# Patient Record
Sex: FEMALE | Race: WHITE
[De-identification: names, ages, dates, MRNs, and addresses within clinical notes are randomized per-mention and may not be internally consistent; named-entity substitution may affect disease eponyms.]

---

## 2017-02-15 ENCOUNTER — HOSPITAL ENCOUNTER (OUTPATIENT)
Dept: HOSPITAL 47 - PNWHC3 | Age: 32
Discharge: HOME | End: 2017-02-15
Payer: COMMERCIAL

## 2017-02-15 VITALS
DIASTOLIC BLOOD PRESSURE: 72 MMHG | RESPIRATION RATE: 16 BRPM | HEART RATE: 107 BPM | TEMPERATURE: 97.8 F | SYSTOLIC BLOOD PRESSURE: 114 MMHG

## 2017-02-15 DIAGNOSIS — G89.4: ICD-10-CM

## 2017-02-15 DIAGNOSIS — Z72.0: ICD-10-CM

## 2017-02-15 DIAGNOSIS — F12.90: ICD-10-CM
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
## 2017-02-15 DIAGNOSIS — M79.7: Primary | ICD-10-CM

## 2017-02-15 DIAGNOSIS — Z79.899: ICD-10-CM

## 2017-02-15 DIAGNOSIS — S39.92XD: ICD-10-CM

## 2017-02-15 PROCEDURE — 99211 OFF/OP EST MAY X REQ PHY/QHP: CPT
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
## 2018-12-28 ENCOUNTER — HOSPITAL ENCOUNTER (EMERGENCY)
Dept: HOSPITAL 47 - EC | Age: 33
Discharge: HOME | End: 2018-12-28
Payer: COMMERCIAL

## 2018-12-28 VITALS
HEART RATE: 91 BPM | RESPIRATION RATE: 20 BRPM | TEMPERATURE: 97.4 F | SYSTOLIC BLOOD PRESSURE: 130 MMHG | DIASTOLIC BLOOD PRESSURE: 88 MMHG

## 2018-12-28 DIAGNOSIS — S93.401A: Primary | ICD-10-CM

## 2018-12-28 DIAGNOSIS — F17.200: ICD-10-CM
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
## 2018-12-28 DIAGNOSIS — Z88.5: ICD-10-CM

## 2018-12-28 DIAGNOSIS — F41.9: ICD-10-CM

## 2018-12-28 DIAGNOSIS — G40.909: ICD-10-CM

## 2018-12-28 DIAGNOSIS — Z79.899: ICD-10-CM

## 2018-12-28 DIAGNOSIS — F32.9: ICD-10-CM
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
## 2018-12-28 PROCEDURE — 99284 EMERGENCY DEPT VISIT MOD MDM: CPT
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
## 2018-12-28 NOTE — XR
EXAMINATION TYPE: XR tibia fibula RT

 

DATE OF EXAM: 12/28/2018

 

COMPARISON: NONE

 

HISTORY: Pain

 

TECHNIQUE: 3 views

 

FINDINGS: Tibia and fibula appear intact. I see no fracture nor dislocation. There are no pathologic 
calcifications. Knee joint is intact.

 

IMPRESSION: Negative right tibia and fibula exam.

## 2018-12-28 NOTE — XR
EXAMINATION TYPE: XR ankle complete RT

 

DATE OF EXAM: 12/28/2018

 

COMPARISON: NONE

 

HISTORY: Pain

 

TECHNIQUE: 3 views

 

FINDINGS: Ankle mortise is anatomic. I see no fracture nor dislocation. Joint spaces are normal. Ther
e are no pathologic calcifications.

 

IMPRESSION: Negative right ankle exam.

## 2020-03-05 ENCOUNTER — HOSPITAL ENCOUNTER (OUTPATIENT)
Dept: HOSPITAL 47 - RADUSMAIN | Age: 35
Discharge: HOME | End: 2020-03-05
Attending: FAMILY MEDICINE
Payer: COMMERCIAL

## 2020-03-05 DIAGNOSIS — K83.8: ICD-10-CM

## 2020-03-05 DIAGNOSIS — K80.20: Primary | ICD-10-CM

## 2020-03-05 PROCEDURE — 76705 ECHO EXAM OF ABDOMEN: CPT

## 2020-03-05 NOTE — US
EXAMINATION TYPE: US abdomen limited

 

DATE OF EXAM: 3/5/2020

 

COMPARISON: NONE

 

CLINICAL HISTORY: R74.8 abnormal levels os serum enzymes. Abnormal labs, ABD pain

 

EXAM MEASUREMENTS:

 

Liver Length:  14.8 cm   

Gallbladder Wall:  0.2 cm   

CBD:  0.7 cm

Right Kidney:  10.4 x 5.3 x 5.4 cm

 

 

 

Pancreas:  Obscured by bowel gas

Liver:  Heterogeneous  

Gallbladder:  Multiple mobile gallstones filing lumen

**Evidence for sonographic Arango's sign:  Yes

CBD:  Dilated 

Right Kidney:  wnl, lower pole gassed out 

 

 

 

IMPRESSION: 

1. Cholelithiasis. CBD is borderline dilated distal CBD stone would be in the differential diagnosis 
correlate clinically.

2. Nonspecific pattern of liver can be seen with hepatic steatosis, hepatitis or diffuse hepatocellul
ar disease.

## 2022-02-26 NOTE — US
EXAMINATION TYPE: US gallbladder

 

DATE OF EXAM: 2/26/2022

 

COMPARISON: NONE

 

CLINICAL HISTORY: pain.

 

EXAM MEASUREMENTS:

 

Liver Length:  13.9 cm   

Gallbladder Wall:  0.7 cm   

CBD:  0.4 cm

Right Kidney:  10.1 x 5.9 x 4.2 cm

 

Morbidly obese patient unable to cooperate with examiner due to severe pain. Very limited study.

 

Pancreas:  Obscured by bowel gas

Liver:  minimal visualization shows no obvious abnormality 

Gallbladder:  JOEL sign, probable thickened wall

**Evidence for sonographic Arango's sign:  no

CBD:  wnl, very limited visualization

Right Kidney:  wnl, as seen, limited views

 

 

 

IMPRESSION:

 

Gallbladder is contracted and full of gallstones. No dilated ducts. No evidence of a discrete liver m
ass.
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
## 2022-02-27 NOTE — P.PN
Subjective


Progress Note Date: 02/27/22


Principal diagnosis: 


Acute cholecystitis, perforated antral ulcer, diffuse peritonitis





Patient seen and examined at bedside, has been having issues with breakthrough 

pain in between 3 hour Dilaudid dosing and concurrent nausea with medications.  

Has been up to chair today, admits to flatus, no bowel movement yet.  On the 

whole feeling better than when she initially came in but she is having worsened 

midline incisional pain today and subdiaphragmatic pain with deep breathing.  VALERIA

drains of been yielding serosanguineous fluid.  Has been mildly tachycardic to 

the high 90s in the low 100s, normotensive to hypertensive overnight.  R[place] in 

place with adequate urine output.  Nasogastric tube in place with mild dark 

greenish gastric output no signs of upper GI bleed.  Laboratory studies show 

persistent leukocytosis of 19,000, hemoglobin relatively stable at 12.5 platelet

count 311.  Serum electrolyte analysis shows a slightly low CO2 at 20, liver 

function studies essentially within normal limits with AST and ELT at 32 and 53 

respectively, PHOSPHATASE 98, TOTAL BILIRUBIN NORMAL RANGE AT 0.5.  LIPASE 21.








Objective





- Vital Signs


Vital signs: 


                                   Vital Signs











Temp  98.5 F   02/27/22 08:00


 


Pulse  96   02/27/22 11:00


 


Resp  18   02/27/22 11:00


 


BP  133/82   02/27/22 11:00


 


Pulse Ox  94 L  02/27/22 11:05








                                 Intake & Output











 02/26/22 02/27/22 02/27/22





 18:59 06:59 18:59


 


Intake Total 2700 1400 400


 


Output Total 425 1175 400


 


Balance 2275 225 0


 


Weight  126 kg 


 


Intake:   


 


  IV 2700 1400 400


 


    Ampicillin-Sulbactam 3 gm  200 





    In Sodium Chloride 0.9%   





    100 ml @ 200 mls/hr IVPB   





    Q6HR BETTE Rx#:[id number]   


 


    Fluconazole in NaCl,Iso- 100  





    Osm 200 mg In Saline 1   





    100ml.bag @ 100 mls/hr   





    IVPB DAILY@1600 BETTE Rx#:   





    [id number]   


 


    Sodium Chloride 0.9% 1, 300 1200 400





    000 ml @ 100 mls/hr IV .   





    Q10H BETTE Rx#:[id number]   


 


Output:   


 


  Gastric Drainage  50 


 


  Drainage  90 


 


    Left Abdomen  30 


 


    Right Abdomen  60 


 


  Urine 325 1035 400


 


  Estimated Blood Loss 100  


 


Other:   


 


  Voiding Method  Indwelling Catheter Indwelling Catheter


 


  # Voids 0  














- Constitutional


General appearance: Present: cooperative, obese





- EENT


Eyes: Present: PERRLA





- Respiratory


Respiratory: bilateral: CTA





- Cardiovascular


Rhythm: regular





- Gastrointestinal


Gastrointestinal Comment(s): 


Midline wound VAC in place to suction without leak, VALERIA drains in place with 

serosanguineous output.  Right upper quadrant pain is essentially resolved, she 

has moderate and appropriate midline incisional tenderness, diffuse tenderness 

is absent.  No guarding rebound or distention.  Abdominal exam on the whole 

improved compared to preoperatively yesterday.








- Genitourinary


Genitourinary Comment(s): 


R[place] in place with clear urine output








- Neurologic


Neurologic: Present: CNII-XII intact





- Psychiatric


Psychiatric: Present: A&O x's 3





- Labs


CBC & Chem 7: 


                                 02/27/22 05:54





                                 02/27/22 05:54


Labs: 


                  Abnormal Lab Results - Last 24 Hours (Table)











  02/26/22 02/26/22 02/27/22 Range/Units





  16:00 23:46 05:54 


 


WBC    19.0 H  (3.8-10.6)  k/uL


 


Neutrophils #    16.5 H  (1.3-7.7)  k/uL


 


Chloride     ()  mmol/L


 


Carbon Dioxide     (22-30)  mmol/L


 


POC Glucose (mg/dL)  161 H  129 H   (75-99)  mg/dL


 


Calcium     (8.4-10.2)  mg/dL


 


ALT     (4-34)  U/L


 


Total Protein     (6.3-8.2)  g/dL


 


Albumin     (3.5-5.0)  g/dL


 


Lipase     ()  U/L














  02/27/22 Range/Units





  05:54 


 


WBC   (3.8-10.6)  k/uL


 


Neutrophils #   (1.3-7.7)  k/uL


 


Chloride  112 H  ()  mmol/L


 


Carbon Dioxide  20 L  (22-30)  mmol/L


 


POC Glucose (mg/dL)   (75-99)  mg/dL


 


Calcium  7.6 L  (8.4-10.2)  mg/dL


 


ALT  53 H  (4-34)  U/L


 


Total Protein  5.5 L  (6.3-8.2)  g/dL


 


Albumin  2.6 L  (3.5-5.0)  g/dL


 


Lipase  21 L  ()  U/L








                      Microbiology - Last 24 Hours (Table)











 02/26/22 13:30 Gram Stain - Preliminary





 Peritoneal Fluid Body Fluid Culture - Preliminary


 


 02/26/22 13:30 Anaerobic Culture - Preliminary





 Peritoneal Fluid 


 


 02/26/22 13:30 Fungal Culture - Preliminary





 Peritoneal Fluid 














Assessment and Plan


Assessment: 


36-year-old lady postoperative day 1 from laparoscopic cholecystectomy for acute

cholecystitis and subsequent exploratory laparotomy and washout with modified 

Lexa patch repair of a 1 cm perforated antral ulcer with diffuse peritonitis. 

On broad-spectrum antibiotics with Unasyn and antifungal coverage with 

fluconazole.  Cultures pending.  Waxing and waning hypotension preoperatively, 

likely a degree of sepsis which seems to be subsiding with source control.  

Long-standing NSAID use with ibuprofen 800 mg every 8 hours for the past 10 or 

more years and intermittent symptoms of melena.  Differential however does not 

implicate an iron deficiency anemia.





Plan: 


Pain and nausea medications were adjusted with a 2 mg Dilaudid every 3 hour dose

and intervening 0.5-1 mg Dilaudid doses every 1 hour for breakthrough.  Baseline

nonnarcotic pain relief with 04 mouth every 8 hours 4 doses.  Oral medications 

will be unreliable for now given antral perforation, nasogastric decompression 

and anticipated associated ileus.  No Toradol given antral ulcer, suspected 

degree of gastritis and risk of bleeding.  We will ask for wound care 

consultation for bedside midline incisional wound VAC dressing changes every 

Monday Wednesday and Friday.  Soft tissue was left open due to gross 

contamination at time of surgery, fascia is closed.  Antibiotic adjustments 

pending culture results, present coverage and source control seems adequate for 

now.  If she remains hemodynamically stable overnight I anticipate she may be 

ready for transfer out of the ICU by tomorrow, [place] catheter for strict intake 

and output monitoring for now.  Increase activity as pain control improved.  ICU

consultation reviewed and appreciated.





Time with Patient: Greater than 30
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
## 2022-02-28 NOTE — XR
EXAMINATION TYPE: XR chest 1V confirm line plcmt, XR chest 1V confirm line plcmt

 

DATE OF EXAM: 2/28/2022

 

COMPARISON: NONE

 

HISTORY: Status post PICC line placement

 

TECHNIQUE: Single frontal view of the chest is obtained. Follow-up chest x-ray performed following PI
CC line position

 

FINDINGS: Initial chest x-ray shows the following.  Left-sided PICC line is present. Distal tip is wi
thin the heart. Exam is expiratory and rotated. There is a drain in the left upper quadrant. Surgical
 clips present right upper quadrant. No evident pneumothorax. Drain is present in the right upper aroldo
drant.

 

Following repositioning of the PICC line followed chest x-ray shows the distal tip of the catheter ne
ar the cavoatrial junction level.

 

IMPRESSION:  PICC line as described

## 2022-02-28 NOTE — IR
EXAMINATION TYPE: IR cvc insert >=5 years

 

DATE OF EXAM: 2/28/2022

 

COMPARISON: NONE

 

HISTORY: Infection, needs long-term intravenous access for antibiotics

 

FINDINGS: Maximal barrier technique was utilized.  Hand hygiene obtained with soap and water and alco
hol-based hand rub. The skin overlying the left basilic vein was localized with ultrasound and noted 
to be compressible and patent by ultrasound.  [facility]. Sterile technique utilized with the ultrasound machine. The skin overlying was prepped an
d draped and Lidocaine used for local anesthesia.  A skin nick was made with a scalpel.  Access was g
ained to the vein under direct ultrasound guidance with a 21-gauge needle and a 0.018 inch wire was a
dvanced.  Access site was dilated with a peel-away sheath and the catheter tailored to length.  Pauly
ter advanced centrally and a post procedure chest x-ray verified placement with tip at the superior v
sebastian cava.  Catheter was fixed to the skin and a sterile dressing placed.  Hemostasis achieved and the
 catheter was aspirated and flushed with sterile saline.  The patient remained in stable condition.

 

IMPRESSION: STATUS POST ULTRASOUND GUIDED PICC LINE PLACEMENT, READY FOR USE.  THIS PROCEDURE WAS PER
FORMED BY THE UNDERSIGNED.
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
## 2022-03-01 NOTE — P.PN
Subjective


Progress Note Date: 03/01/22





Patient seen and examined at bedside.  States she is feeling much better today. 

States she is having flatus.  States abdominal pain is well-controlled.  Denies 

nausea or vomiting.  VALERIA drains with serosanguineous output.





Objective





- Vital Signs


Vital signs: 


                                   Vital Signs











Temp  99.4 F   03/01/22 04:00


 


Pulse  93   03/01/22 06:00


 


Resp  18   03/01/22 06:00


 


BP  136/77   03/01/22 06:00


 


Pulse Ox  92 L  03/01/22 06:00








                                 Intake & Output











 02/28/22 03/01/22 03/01/22





 18:59 06:59 18:59


 


Intake Total 1500 1600 


 


Output Total 840 795 


 


Balance 660 805 


 


Intake:   


 


  IV 1500 1600 


 


    ACETAMINOPHEN IV (For  200 





    ) 1,000 mg In Empty Bag 1   





    bag @ 400 mls/hr IVPB   





    Q8H BETTE Rx#:[id number]   


 


    Ampicillin-Sulbactam 3 gm 200 200 





    In Sodium Chloride 0.9%   





    100 ml @ 200 mls/hr IVPB   





    Q6HR BETTE Rx#:[id number]   


 


    Fluconazole in NaCl,Iso- 100  





    Osm 200 mg In Saline 1   





    100ml.bag @ 100 mls/hr   





    IVPB DAILY@1600 BETTE Rx#:   





    [id number]   


 


    Sodium Chloride 0.9% 1, 1100 1200 





    000 ml @ 100 mls/hr IV .   





    Q10H BETTE Rx#:[id number]   


 


Output:   


 


  Drainage 120 80 


 


    Abdomen 20 20 


 


    Left Abdomen 60 40 


 


    Right Abdomen 40 20 


 


  Urine 720 715 


 


Other:   


 


  Voiding Method Indwelling Catheter Indwelling Catheter 














- Constitutional


General appearance: Present: cooperative, no acute distress





- Respiratory


Details: 





No difficulty with respiration





- Gastrointestinal


Gastrointestinal Comment(s): 





Soft, appropriate tenderness, wound VAC in place with appropriate seal and 

suction, VALERIA drains in place with serosanguineous output, nondistended





- Musculoskeletal


Musculoskeletal: Present: generalized weakness





- Psychiatric


Psychiatric: Present: A&O x's 3





- Labs


CBC & Chem 7: 


                                 03/01/22 06:10





                                 03/01/22 06:10


Labs: 


                  Abnormal Lab Results - Last 24 Hours (Table)











  02/28/22 03/01/22 03/01/22 Range/Units





  18:35 06:10 06:10 


 


WBC   11.9 H   (3.8-10.6)  k/uL


 


RBC   3.69 L   (3.80-5.40)  m/uL


 


Hgb   10.5 L   (11.4-16.0)  gm/dL


 


Hct   32.8 L   (34.0-46.0)  %


 


Neutrophils #   9.3 H   (1.3-7.7)  k/uL


 


Chloride    114 H  ()  mmol/L


 


Carbon Dioxide    20 L  (22-30)  mmol/L


 


POC Glucose (mg/dL)  72 L    (75-99)  mg/dL


 


Calcium    7.4 L  (8.4-10.2)  mg/dL


 


Total Protein    5.6 L  (6.3-8.2)  g/dL


 


Albumin    2.6 L  (3.5-5.0)  g/dL








                      Microbiology - Last 24 Hours (Table)











 02/26/22 13:30 Anaerobic Culture - Preliminary





 Peritoneal Fluid 


 


 02/26/22 13:30 Gram Stain - Preliminary





 Peritoneal Fluid Body Fluid Culture - Preliminary














Assessment and Plan


Plan: 





36-year-old female postoperative day #3, exploratory laparotomy, Lexa patch 

repair and cholecystectomy


-Continue nasogastric tube and n.p.o. status at this time.  Patient is beginning

to have some bowel function, however, we will require more time prior to oral 

intake.  We will consider possible upper GI prior to beginning oral intake in 

the next day or 2.


-PICC line was placed.  Continue ID recommendations on IV antibiotics.  We'll 

consider TPN if no oral feeding soon


-Continue wound VAC with wound care consultation


- Increase activity.


- Plan for discontinuing Rome catheter in the next 24 hours


-Patient is likely able to be transferred from ICU to stepdown
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
## 2022-03-01 NOTE — P.PN
Subjective


Progress Note Date: 02/28/22





Patient seen and examined at bedside.  She states abdominal pain is overall well

controlled.  She is drowsy.  Nasogastric tube is in place.  VALERIA drains with 

serosanguineous output.  She states that her mouth is dry and she is requesting 

oral intake.





Objective





- Vital Signs


Vital signs: 


                                   Vital Signs











Temp  97.9 F   02/28/22 12:00


 


Pulse  84   02/28/22 12:00


 


Resp  15   02/28/22 12:00


 


BP  127/75   02/28/22 12:00


 


Pulse Ox  96   02/28/22 12:00








                                 Intake & Output











 02/27/22 02/28/22 02/28/22





 18:59 06:59 18:59


 


Intake Total 1600 1200 600


 


Output Total 905 830 370


 


Balance 695 370 230


 


Weight  124.2 kg 


 


Intake:   


 


  IV 1600 1200 600


 


    ACETAMINOPHEN IV (For  300 100





    ) 1,000 mg In Empty Bag 1   





    bag @ 400 mls/hr IVPB   





    Q8H BETTE Rx#:[id number]   


 


    Ampicillin-Sulbactam 3 gm 200  





    In Sodium Chloride 0.9%   





    100 ml @ 200 mls/hr IVPB   





    Q6HR BETTE Rx#:[id number]   


 


    Fluconazole in NaCl,Iso- 100  





    Osm 200 mg In Saline 1   





    100ml.bag @ 100 mls/hr   





    IVPB DAILY@1600 BETTE Rx#:   





    [id number]   


 


    Sodium Chloride 0.9% 1, 600 900 500





    000 ml @ 100 mls/hr IV .   





    Q10H BETTE Rx#:[id number]   


 


Output:   


 


  Drainage 60 40 


 


    Left Abdomen 30 40 


 


    Right Abdomen 30  


 


  Urine 845 790 370


 


Other:   


 


  Voiding Method Indwelling Catheter Indwelling Catheter Indwelling Catheter














- Constitutional


General appearance: Present: cooperative, no acute distress





- Respiratory


Details: 





No difficulty with respiration





- Gastrointestinal


Gastrointestinal Comment(s): 





Soft, appropriate tenderness, nondistended, wound VAC in place over wound, VALERIA 

drains in place with serosanguineous output.





- Musculoskeletal


Musculoskeletal: Present: generalized weakness





- Labs


CBC & Chem 7: 


                                 02/28/22 06:31





                                 02/28/22 06:31


Labs: 


                  Abnormal Lab Results - Last 24 Hours (Table)











  02/28/22 02/28/22 Range/Units





  06:31 06:31 


 


WBC  11.6 H   (3.8-10.6)  k/uL


 


RBC  3.72 L   (3.80-5.40)  m/uL


 


Hgb  10.7 L   (11.4-16.0)  gm/dL


 


Hct  33.5 L   (34.0-46.0)  %


 


Neutrophils #  9.2 H   (1.3-7.7)  k/uL


 


Chloride   113 H  ()  mmol/L


 


Calcium   7.3 L  (8.4-10.2)  mg/dL








                      Microbiology - Last 24 Hours (Table)











 02/26/22 13:30 Gram Stain - Preliminary





 Peritoneal Fluid Body Fluid Culture - Preliminary














Assessment and Plan


Plan: 





36-year-old female postoperative day #2, exploratory laparotomy, Lexa patch 

repair and cholecystectomy


-Continue nasogastric tube and n.p.o. status at this time.  Patient is beginning

to have some bowel function, however, we will require more time prior to oral 

intake.  We will consider possible upper GI prior to beginning oral intake.


-Plan for PICC line placement.  Patient is to continue with Unasyn and 

antifungal for antibiotics.  ID consultation will be placed


-Continue wound VAC with wound care consultation


-Patient is likely able to be transferred from ICU to stepdown
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
## 2022-03-01 NOTE — P.PN
Subjective


Progress Note Date: 03/01/22


Principal diagnosis: 


Perforated peptic ulcer disease and cholecystitis





Patient is a 36-year-old  female presenting to the ER or abdominal pain

in this patient did have evidence of cholecystitis perforated peptic ulcer 

disease status post cholecystectomy and modified Lexa patch repair of the 

perforated antral ulcer.


On today's evaluation that is 03/01/2022, the patient is afebrile patient is 

feeling slightly better the patient abdominal pain is currently controlled 

patient still have the NG however denies having any nausea or vomiting no 

diarrhea no chest pain shortness of breath or cough








Objective





- Vital Signs


Vital signs: 


                                   Vital Signs











Temp  99.4 F   03/01/22 04:00


 


Pulse  82   03/01/22 09:00


 


Resp  14   03/01/22 09:00


 


BP  133/80   03/01/22 09:00


 


Pulse Ox  96   03/01/22 09:00








                                 Intake & Output











 02/28/22 03/01/22 03/01/22





 18:59 06:59 18:59


 


Intake Total 1500 1600 400


 


Output Total 840 795 200


 


Balance 660 805 200


 


Intake:   


 


  IV 1500 1600 400


 


    ACETAMINOPHEN IV (For  200 





    ) 1,000 mg In Empty Bag 1   





    bag @ 400 mls/hr IVPB   





    Q8H [facility] Rx#:[id number]   


 


    Ampicillin-Sulbactam 3 gm 200 200 





    In Sodium Chloride 0.9%   





    100 ml @ 200 mls/hr IVPB   





    Q6HR [facility] Rx#:[id number]   


 


    Fluconazole in NaCl,Iso- 100  





    Osm 200 mg In Saline 1   





    100ml.bag @ 100 mls/hr   





    IVPB DAILY@1600 [facility] Rx#:   





    [id number]   


 


    Sodium Chloride 0.9% 1, 1100 1200 400





    000 ml @ 100 mls/hr IV .   





    Q10H [facility] Rx#:[id number]   


 


Output:   


 


  Drainage 120 80 


 


    Abdomen 20 20 


 


    Left Abdomen 60 40 


 


    Right Abdomen 40 20 


 


  Urine 720 715 200


 


Other:   


 


  Voiding Method Indwelling Catheter Indwelling Catheter Indwelling Catheter














- Exam


GENERAL DESCRIPTION: A middle-age female lying in bed in no distress





RESPIRATORY SYSTEM: Unlabored breathing , decreased breath sounds at bases





HEART: S1 S2 regular rate and rhythm ,





ABDOMEN: Soft , mild abdominal tenderness





EXTREMITIES: No edema feet








- Labs


CBC & Chem 7: 


                                 03/01/22 06:10





                                 03/01/22 06:10


Labs: 


                  Abnormal Lab Results - Last 24 Hours (Table)











  02/28/22 03/01/22 03/01/22 Range/Units





  18:35 06:10 06:10 


 


WBC   11.9 H   (3.8-10.6)  k/uL


 


RBC   3.69 L   (3.80-5.40)  m/uL


 


Hgb   10.5 L   (11.4-16.0)  gm/dL


 


Hct   32.8 L   (34.0-46.0)  %


 


Neutrophils #   9.3 H   (1.3-7.7)  k/uL


 


Chloride    114 H  ()  mmol/L


 


Carbon Dioxide    20 L  (22-30)  mmol/L


 


POC Glucose (mg/dL)  72 L    (75-99)  mg/dL


 


Calcium    7.4 L  (8.4-10.2)  mg/dL


 


Total Protein    5.6 L  (6.3-8.2)  g/dL


 


Albumin    2.6 L  (3.5-5.0)  g/dL








                      Microbiology - Last 24 Hours (Table)











 02/26/22 13:30 Anaerobic Culture - Preliminary





 Peritoneal Fluid 


 


 02/26/22 13:30 Gram Stain - Preliminary





 Peritoneal Fluid Body Fluid Culture - Preliminary














Assessment and Plan


(1) Peritonitis


Current Visit: Yes   Status: Acute   Code(s): K65.9 - PERITONITIS, UNSPECIFIED  

SNOMED Code(s): 37214666


   





(2) Perforated ulcer


Current Visit: Yes   Status: Acute   Code(s): K27.5 - CHRONIC OR UNSP PEPTIC 

ULCER, SITE UNSP, WITH PERFORATION   SNOMED Code(s): 64233125


   


Plan: 


1patient with admission to hospital abdominal pain mostly epigastric and right 

upper quadrant area with evidence of cholecystitis and  perforated peptic ulcer 

disease status post laparoscopic cholecystectomy and modified Lexa patch of 

the perforated ulcer will need to cover for the entire gram-negative and yeast 

to the likely pathogen causing peritonitis associated with perforated peptic 

ulcer disease


2abdominal cultures are currently pending and will be followed


3Unasyn 3 g every 6 hours and Diflucan will be continued








Time with Patient: Less than 30

## 2022-03-02 NOTE — FL
EXAMINATION TYPE: FL UGI

 

DATE OF EXAM: 3/2/2022

 

COMPARISON: None available

 

HISTORY: Status post cholecystectomy and repair of the gastric pyloric ulcer

 

TECHNIQUE:  A limited single contrast UGI study is performed.  A total of 2 minutes and 42 seconds of
 fluoroscopic time was utilized during procedure and 43 images obtained.

 

FINDINGS:

Cholecystectomy clips. 2 surgical drains are seen in the abdomen. NG tube is also seen in place.

 

Limited study to assess for leak. Free flow of the ingested oral contrast through the esophagus into 
the stomach without evidence of delay or obstruction. The contrast has passed into the first and seco
nd parts of duodenum with some transit delay into the third part of the duodenum.

 

No evidence of leak or obstruction at the operative site. Slight irregularity at the superior aspect 
of the pyloric antrum which could be related to the recent surgery. The patient remained asymptomatic
.

 

IMPRESSION:

No evidence of leak at the operative bed.

## 2022-03-02 NOTE — P.PN
Subjective


Progress Note Date: 03/02/22





Patient seen and examined at bedside. States he has been doing well. Denies 

nausea or vomiting. Pain controlled. Denies nausea or vomiting.





Objective





- Vital Signs


Vital signs: 


                                   Vital Signs











Temp  98 F   03/02/22 04:37


 


Pulse  80   03/02/22 04:37


 


Resp  18   03/02/22 04:37


 


BP  150/88   03/02/22 04:37


 


Pulse Ox  100   03/02/22 04:37








                                 Intake & Output











 03/01/22 03/02/22 03/02/22





 18:59 06:59 18:59


 


Intake Total 1600 500 


 


Output Total 790 1935 


 


Balance 810 -1435 


 


Intake:   


 


  IV 1500 500 


 


    ACETAMINOPHEN IV (For   





    ) 1,000 mg In Empty Bag 1   





    bag @ 400 mls/hr IVPB   





    Q8H [facility] Rx#:[id number]   


 


    Ampicillin-Sulbactam 3 gm 200  





    In Sodium Chloride 0.9%   





    100 ml @ 200 mls/hr IVPB   





    Q6HR [facility] Rx#:[id number]   


 


    Fluconazole in NaCl,Iso- 100  





    Osm 200 mg In Saline 1   





    100ml.bag @ 100 mls/hr   





    IVPB DAILY@1600 [facility] Rx#:   





    [id number]   


 


    Sodium Chloride 0.9% 1, 1100 500 





    000 ml @ 100 mls/hr IV .   





    Q10H [facility] Rx#:[id number]   


 


  Intake, IV Titration 100  





  Amount   


 


    Potassium Chloride 20 meq 100  





    In Water For Injection 1   





    100ml.bag @ 50 mls/hr   





    IVPB Q2H [facility] Rx#:   





    [id number]   


 


Output:   


 


  Drainage 120 135 


 


    Abdomen 30 40 


 


    Left Abdomen 40 20 


 


    Right Abdomen 50 75 


 


  Urine 670 1800 


 


Other:   


 


  Voiding Method Indwelling Catheter Indwelling Catheter 














- Constitutional


General appearance: Present: cooperative, no acute distress





- Respiratory


Details: 





no difficulty with respiration





- Gastrointestinal


Gastrointestinal Comment(s): 





soft, nontender, nondistended, wound vac in place, VALERIA drains in place with 

serosanguinous output





- Psychiatric


Psychiatric: Present: A&O x's 3





- Labs


CBC & Chem 7: 


                                 03/01/22 06:10





                                 03/01/22 06:10


Labs: 


                      Microbiology - Last 24 Hours (Table)











 02/26/22 13:30 Gram Stain - Preliminary





 Peritoneal Fluid Body Fluid Culture - Preliminary














Assessment and Plan


Plan: 





36-year-old female postoperative day #4, exploratory laparotomy, Lexa patch 

repair and cholecystectomy


-Continue nasogastric tube and n.p.o. status at this time.  UGI ordered for 

today, will evaluate prior to decision to remove NGT and begin liquids


-PICC in place. ID recs on abx


-Continue wound VAC, dressing change discussed with nursing for today


- Increase activity


-Continue med recs
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
## 2022-03-03 NOTE — P.PN
Subjective


Progress Note Date: 03/03/22


Principal diagnosis: 


Acute cholecystitis, perforated antral ulcer, diffuse peritonitis








Patient seen and examined at bedside, sleeping comfortably upright in a chair.  

No acute overnights per nursing.  As been advanced to clear liquids.  [place] 

catheter is in place with adequate clear urine output.  She remained 

hemodynamically stable and afebrile overnight.  Abdominal drains in place with 

predominantly serous output, wound VAC dressing is in place to suction without 

leak and serosanguineous output.  On Unasyn and fluconazole for broad-spectrum 

coverage post foregut perforation





Objective





- Vital Signs


Vital signs: 


                                   Vital Signs











Temp  97.9 F   03/03/22 12:43


 


Pulse  79   03/03/22 12:43


 


Resp  17   03/03/22 12:43


 


BP  123/81   03/03/22 12:43


 


Pulse Ox  94 L  03/03/22 12:43








                                 Intake & Output











 03/02/22 03/03/22 03/03/22





 18:59 06:59 18:59


 


Intake Total  200 


 


Output Total 1900 2870 80


 


Balance -1900 -2670 -80


 


Weight 124.2 kg  124.2 kg


 


Intake:   


 


  Oral  200 


 


Output:   


 


  Drainage 200 270 80


 


    Abdomen 60 20 


 


    Left Abdomen 60 50 80


 


    Right Abdomen 80 200 


 


  Urine 1700 2600 


 


    Uretheral ([place])  1500 


 


Other:   


 


  Voiding Method Indwelling Catheter Indwelling Catheter Indwelling Catheter














- Exam


Sleeping comfortably no acute distress








- Constitutional


General appearance: Present: obese





- Respiratory


Respiratory: bilateral: CTA





- Cardiovascular


Rhythm: regular





- Gastrointestinal


Gastrointestinal Comment(s): 


Abdomen is soft nontender to palpation, no guarding rebound or distention.


We'll VAC is in place to suction without leak.  VALERIA drains in place with 

predominantly serous output





General gastrointestinal: Present: soft





- Labs


CBC & Chem 7: 


                                 03/03/22 05:48





                                 03/03/22 05:48


Labs: 


                  Abnormal Lab Results - Last 24 Hours (Table)











  03/03/22 03/03/22 03/03/22 Range/Units





  05:48 05:48 05:50 


 


RBC  3.93 L    (4.10-5.20)  X 10*6/uL


 


Hgb  10.6 L    (12.0-15.0)  g/dL


 


Hct  33.7 L    (37.2-46.3)  %


 


MCHC  31.5 L    (32.0-37.0)  g/dL


 


MPV  9.2 L    (9.5-12.2)  fL


 


Immature Gran #  0.12 H    (0.00-0.04)  X 10*3/uL


 


Potassium   3.3 L   (3.5-5.5)  mmol/L


 


BUN   5.7 L   (9.0-27.0)  mg/dL


 


BUN/Creatinine Ratio   9.50 L   (12.00-20.00)  Ratio


 


POC Glucose (mg/dL)    109 H  (75-99)  mg/dL


 


Calcium   7.7 L   (8.7-10.3)  mg/dL


 


Total Bilirubin   <0.15 L   (0.30-1.20)  mg/dL


 


Total Protein   5.3 L   (6.2-8.2)  g/dL


 


Albumin   2.9 L   (3.8-4.9)  g/dL


 


Albumin/Globulin Ratio   1.21 L   (1.60-3.17)  g/dL








                      Microbiology - Last 24 Hours (Table)











 02/26/22 13:30 Anaerobic Culture - Final





 Peritoneal Fluid 


 


 02/26/22 13:30 Gram Stain - Final





 Peritoneal Fluid Body Fluid Culture - Final














Assessment and Plan


Assessment: 


36-year-old lady postoperative day 5 from laparoscopic cholecystectomy for acute

cholecystitis and subsequent exploratory laparotomy and washout with modified 

Lexa patch repair of a 1 cm perforated antral ulcer with diffuse peritonitis. 

On broad-spectrum antibiotics with Unasyn and antifungal coverage with 

fluconazole.  Waxing and waning hypotension preoperatively, likely a degree of 

sepsis which seems to be subsiding with source control.  Long-standing NSAID use

with ibuprofen 800 mg every 8 hours for the past 10 or more years and 

intermittent symptoms of melena. 





Plan: 


36-year-old female postoperative day #5, exploratory laparotomy, Lexa patch 

repair and cholecystectomy


-Clear liquids today, we'll likely start getting a VALERIA drains out tomorrow.  

Taper IV fluids and advance diet as oral intake improves.  Discontinue [place] 

catheter.


-PICC in place. ID recs on abx


-Continue wound VAC, dressing change discussed with nursing for today


- Increase activity


-Continue med recs





Time with Patient: Less than 30

## 2022-03-04 NOTE — P.PN
Subjective


Progress Note Date: 03/02/22


Principal diagnosis: 


Perforated peptic ulcer disease and cholecystitis





Patient is a 36-year-old  female presenting to the ER or abdominal pain

in this patient did have evidence of cholecystitis perforated peptic ulcer 

disease status post cholecystectomy and modified Lexa patch repair of the 

perforated antral ulcer.


On today's evaluation that is 03/02/2022, the patient remains to be afebrile , 

the patient abdominal pain is currently controlled patient denies having any 

nausea or vomiting no diarrhea no chest pain shortness of breath or cough








Objective





- Vital Signs


Vital signs: 


                                   Vital Signs











Temp  98 F   03/02/22 04:37


 


Pulse  80   03/02/22 04:37


 


Resp  18   03/02/22 04:37


 


BP  150/88   03/02/22 04:37


 


Pulse Ox  100   03/02/22 04:37








                                 Intake & Output











 03/01/22 03/02/22 03/02/22





 18:59 06:59 18:59


 


Intake Total 1600 500 


 


Output Total 790 1935 


 


Balance 810 -1435 


 


Intake:   


 


  IV 1500 500 


 


    ACETAMINOPHEN IV (For   





    ) 1,000 mg In Empty Bag 1   





    bag @ 400 mls/hr IVPB   





    Q8H [facility] Rx#:[id number]   


 


    Ampicillin-Sulbactam 3 gm 200  





    In Sodium Chloride 0.9%   





    100 ml @ 200 mls/hr IVPB   





    Q6HR [facility] Rx#:[id number]   


 


    Fluconazole in NaCl,Iso- 100  





    Osm 200 mg In Saline 1   





    100ml.bag @ 100 mls/hr   





    IVPB DAILY@1600 [facility] Rx#:   





    [id number]   


 


    Sodium Chloride 0.9% 1, 1100 500 





    000 ml @ 100 mls/hr IV .   





    Q10H [facility] Rx#:[id number]   


 


  Intake, IV Titration 100  





  Amount   


 


    Potassium Chloride 20 meq 100  





    In Water For Injection 1   





    100ml.bag @ 50 mls/hr   





    IVPB Q2H [facility] Rx#:   





    [id number]   


 


Output:   


 


  Drainage 120 135 


 


    Abdomen 30 40 


 


    Left Abdomen 40 20 


 


    Right Abdomen 50 75 


 


  Urine 670 1800 


 


Other:   


 


  Voiding Method Indwelling Catheter Indwelling Catheter 














- Exam


GENERAL DESCRIPTION: A middle-age female lying in bed in no distress





RESPIRATORY SYSTEM: Unlabored breathing , decreased breath sounds at bases





HEART: S1 S2 regular rate and rhythm ,





ABDOMEN: Soft , mild abdominal tenderness





EXTREMITIES: No edema feet








- Labs


CBC & Chem 7: 


                                 03/04/22 05:52





                                 03/04/22 05:52


Labs: 


                      Microbiology - Last 24 Hours (Table)











 02/26/22 13:30 Gram Stain - Preliminary





 Peritoneal Fluid Body Fluid Culture - Preliminary














Assessment and Plan


(1) Peritonitis


Current Visit: Yes   Status: Acute   Code(s): K65.9 - PERITONITIS, UNSPECIFIED  

SNOMED Code(s): 97635280


   





(2) Perforated ulcer


Current Visit: Yes   Status: Acute   Code(s): K27.5 - CHRONIC OR UNSP PEPTIC 

ULCER, SITE UNSP, WITH PERFORATION   SNOMED Code(s): 26136484


   


Plan: 


1patient with admission to hospital abdominal pain mostly epigastric and right 

upper quadrant area with evidence of cholecystitis and  perforated peptic ulcer 

disease status post laparoscopic cholecystectomy and modified Lexa patch of 

the perforated ulcer will need to cover for the entire gram-negative and yeast 

to the likely pathogen causing peritonitis associated with perforated peptic 

ulcer disease


2abdominal cultures are currently pending and will be followed so far no 

resistant bacteria growth, patient to continue with Unasyn 3 g every 6 hours and

Diflucan 





Time with Patient: Less than 30

## 2022-03-04 NOTE — P.PN
Subjective


Progress Note Date: 03/03/22


Principal diagnosis: 


Perforated peptic ulcer disease and cholecystitis





Patient is a 36-year-old  female presenting to the ER or abdominal pain

in this patient did have evidence of cholecystitis perforated peptic ulcer 

disease status post cholecystectomy and modified Lexa patch repair of the 

perforated antral ulcer.


On today's evaluation that is 03/03/2022, the patient is afebrile , the patient 

abdominal pain is currently controlled patient denies any nausea or vomiting no 

diarrhea , the patient denies chest pain shortness of breath or cough








Objective





- Vital Signs


Vital signs: 


                                   Vital Signs











Temp  97.9 F   03/03/22 12:43


 


Pulse  79   03/03/22 12:43


 


Resp  17   03/03/22 12:43


 


BP  123/81   03/03/22 12:43


 


Pulse Ox  94 L  03/03/22 12:43








                                 Intake & Output











 03/02/22 03/03/22 03/03/22





 18:59 06:59 18:59


 


Intake Total  200 


 


Output Total 1900 2870 80


 


Balance -1900 -2670 -80


 


Weight 124.2 kg  124.2 kg


 


Intake:   


 


  Oral  200 


 


Output:   


 


  Drainage 200 270 80


 


    Abdomen 60 20 


 


    Left Abdomen 60 50 80


 


    Right Abdomen 80 200 


 


  Urine 1700 2600 


 


    Uretheral ([place])  1500 


 


Other:   


 


  Voiding Method Indwelling Catheter Indwelling Catheter Indwelling Catheter














- Exam


GENERAL DESCRIPTION: A middle-age female lying in bed in no distress





RESPIRATORY SYSTEM: Unlabored breathing , decreased breath sounds at bases





HEART: S1 S2 regular rate and rhythm ,





ABDOMEN: Soft , mild abdominal tenderness





EXTREMITIES: No edema feet








- Labs


CBC & Chem 7: 


                                 03/04/22 05:52





                                 03/04/22 05:52


Labs: 


                  Abnormal Lab Results - Last 24 Hours (Table)











  03/03/22 03/03/22 03/03/22 Range/Units





  05:48 05:48 05:50 


 


RBC  3.93 L    (4.10-5.20)  X 10*6/uL


 


Hgb  10.6 L    (12.0-15.0)  g/dL


 


Hct  33.7 L    (37.2-46.3)  %


 


MCHC  31.5 L    (32.0-37.0)  g/dL


 


MPV  9.2 L    (9.5-12.2)  fL


 


Immature Gran #  0.12 H    (0.00-0.04)  X 10*3/uL


 


Potassium   3.3 L   (3.5-5.5)  mmol/L


 


BUN   5.7 L   (9.0-27.0)  mg/dL


 


BUN/Creatinine Ratio   9.50 L   (12.00-20.00)  Ratio


 


POC Glucose (mg/dL)    109 H  (75-99)  mg/dL


 


Calcium   7.7 L   (8.7-10.3)  mg/dL


 


Total Bilirubin   <0.15 L   (0.30-1.20)  mg/dL


 


Total Protein   5.3 L   (6.2-8.2)  g/dL


 


Albumin   2.9 L   (3.8-4.9)  g/dL


 


Albumin/Globulin Ratio   1.21 L   (1.60-3.17)  g/dL








                      Microbiology - Last 24 Hours (Table)











 02/26/22 13:30 Anaerobic Culture - Final





 Peritoneal Fluid 


 


 02/26/22 13:30 Gram Stain - Final





 Peritoneal Fluid Body Fluid Culture - Final














Assessment and Plan


(1) Peritonitis


Current Visit: Yes   Status: Acute   Code(s): K65.9 - PERITONITIS, UNSPECIFIED  

SNOMED Code(s): 45585019


   





(2) Perforated ulcer


Current Visit: Yes   Status: Acute   Code(s): K27.5 - CHRONIC OR UNSP PEPTIC 

ULCER, SITE UNSP, WITH PERFORATION   SNOMED Code(s): 56589008


   


Plan: 


1patient with admission to hospital abdominal pain mostly epigastric and right 

upper quadrant area with evidence of cholecystitis and  perforated peptic ulcer 

disease status post laparoscopic cholecystectomy and modified Lexa patch of 

the perforated ulcer will need to cover for the entire gram-negative and yeast 

to the likely pathogen causing peritonitis associated with perforated peptic 

ulcer disease


2abdominal fluid cultures has been finalized as no growth, patient to continue 

with Unasyn 3 g every 6 hours and Diflucan 





Time with Patient: Less than 30
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
## 2022-03-04 NOTE — P.PN
Subjective


Progress Note Date: 03/04/22


Principal diagnosis: 


Acute cholecystitis, perforated antral ulcer, diffuse peritonitis





Patient seen and examined at bedside.  Doing better today than yesterday, 

incisional and abdominal pain or improving, no reported fever or chills.  

Tolerating clear liquids, positive bowel movement yesterday.  Feeling hungry.  

No reports of chest pain, shortness of breath, or fever.  Laboratory studies 

today show a mild leukocytosis at around 10,000, hemoglobin stable.  Serum 

electrolyte analysis and liver function studies essentially normal.  Chest x-ray

from today shows a left-sided PICC in place, hypoinflation and blunting of 

costophrenic angles bilaterally.  Abdominal drains remain serous.  Wound VAC 

dressing as been changed at bedside by wound care team.








Objective





- Vital Signs


Vital signs: 


                                   Vital Signs











Temp  98.1 F   03/04/22 05:00


 


Pulse  87   03/04/22 05:00


 


Resp  18   03/04/22 05:00


 


BP  127/76   03/04/22 05:00


 


Pulse Ox  92 L  03/04/22 05:00








                                 Intake & Output











 03/03/22 03/04/22 03/04/22





 18:59 06:59 18:59


 


Intake Total 250 120 


 


Output Total 370 40 


 


Balance -120 80 


 


Weight 124.2 kg  124.2 kg


 


Intake:   


 


  Intake, IV Titration 250  





  Amount   


 


    D5-0.45% NaCl with KCl 150  





    20Meq/l 1,000 ml @ 75 mls   





    /hr IV .[id number] [facility] Rx#:   





    [id number]   


 


    Fluconazole in NaCl,Iso- 100  





    Osm 200 mg In Saline 1   





    100ml.bag @ 100 mls/hr   





    IVPB DAILY@1600 [facility] Rx#:   





    [id number]   


 


  Oral  120 


 


Output:   


 


  Drainage 370 40 


 


    Abdomen 130 20 


 


    Left Abdomen 120 20 


 


    Right Abdomen 120  


 


Other:   


 


  Voiding Method Toilet Toilet Toilet


 


  # Voids 1 1 1


 


  # Bowel Movements 1  














- Exam


Patient is alert and oriented 3, abdomen is soft nontender to palpation, no 

guarding rebound or distention.  Abdominal wound VAC dressing is in place to 

suction without leak.  VALERIA drains in place 3 with serous aspirate.








- Constitutional


General appearance: Present: no acute distress, obese





- EENT


Eyes: Present: PERRLA





- Respiratory


Respiratory: bilateral: CTA, diminished





- Cardiovascular


Rhythm: regular





- Gastrointestinal


Gastrointestinal Comment(s): 


Abdomen is soft nontender to palpation, no guarding rebound or distention.








- Neurologic


Neurologic: Present: CNII-XII intact





- Musculoskeletal


Musculoskeletal: Present: strength equal bilaterally





- Psychiatric


Psychiatric: Present: A&O x's 3, appropriate affect, intact judgment & insight





- Labs


CBC & Chem 7: 


                                 03/04/22 05:52





                                 03/04/22 05:52


Labs: 


                  Abnormal Lab Results - Last 24 Hours (Table)











  03/04/22 03/04/22 03/04/22 Range/Units





  05:52 05:52 12:03 


 


WBC  10.59 H    (4.50-10.00)  X 10*3/uL


 


RBC  3.88 L    (4.10-5.20)  X 10*6/uL


 


Hgb  10.7 L    (12.0-15.0)  g/dL


 


Hct  33.5 L    (37.2-46.3)  %


 


MCHC  31.9 L    (32.0-37.0)  g/dL


 


RDW  14.7 H    (11.5-14.5)  %


 


MPV  9.3 L    (9.5-12.2)  fL


 


Immature Gran #  0.37 H    (0.00-0.04)  X 10*3/uL


 


Chloride   110 H   ()  mmol/L


 


Anion Gap   8.40 L   (10.00-18.00)  mmol/L


 


BUN   2.9 L   (9.0-27.0)  mg/dL


 


BUN/Creatinine Ratio   4.14 L   (12.00-20.00)  Ratio


 


POC Glucose (mg/dL)    102 H  (75-99)  mg/dL


 


Calcium   7.8 L   (8.7-10.3)  mg/dL


 


Total Bilirubin   0.20 L   (0.30-1.20)  mg/dL


 


Total Protein   5.3 L   (6.2-8.2)  g/dL


 


Albumin   2.8 L   (3.8-4.9)  g/dL


 


Albumin/Globulin Ratio   1.12 L   (1.60-3.17)  g/dL














- Imaging and Cardiology


Chest x-ray: image reviewed





Assessment and Plan


Assessment: 


36-year-old lady postoperative day 6 from laparoscopic cholecystectomy for acute

cholecystitis and subsequent exploratory laparotomy and washout with modified 

Lexa patch repair of a 1 cm perforated antral ulcer with diffuse peritonitis. 

On broad-spectrum antibiotics with Unasyn and antifungal coverage with 

fluconazole.  Long-standing NSAID use with ibuprofen 800 mg every 8 hours for 

the past 10 or more years and intermittent symptoms of melena. 





Plan: 





Splenic recess and pelvic VALERIA drains removed at bedside.  Continue with 

antibiotics for full 7 day course.  On Protonix drip equivalent with twice daily

dosing, will need to continue with twice daily PPI on discharge for at least 8 

weeks.  No NSAIDs long-term.  Taper maintenance IV fluids.  May discontinue 

telemetry.  Advance to regular diet as tolerated.  Start moving towards oral 

pain medications.  Anticipate discharge home over the weekend or Monday at the 

latest barring any further issues will need follow-up diagnostic EGD at around 8

weeks post discharge to assess for healing of antral ulcer and investigate for 

other potential pathology.  Outpatient wound VAC dressing changes every Monday Wednesday Friday with wound care service on discharge.  Follow-up with general 

surgery in the office 1 week post discharge and with primary care 1-2 weeks post

discharge.


Time with Patient: Greater than 30

## 2022-03-04 NOTE — P.PN
Subjective


Progress Note Date: 03/04/22


Principal diagnosis: 


Perforated peptic ulcer disease and cholecystitis





Patient is a 36-year-old  female presenting to the ER or abdominal pain

in this patient did have evidence of cholecystitis perforated peptic ulcer 

disease status post cholecystectomy and modified Lexa patch repair of the 

perforated antral ulcer.


On today's evaluation that is 03/04/2022, the patient denies any fever or 

chills, the patient overall is feeling better, the patient abdominal pain is c

urrently controlled patient denies having any nausea or vomiting no diarrhea no 

chest pain shortness of breath or cough








Objective





- Vital Signs


Vital signs: 


                                   Vital Signs











Temp  97.5 F L  03/04/22 13:00


 


Pulse  80   03/04/22 13:00


 


Resp  18   03/04/22 13:00


 


BP  129/86   03/04/22 13:00


 


Pulse Ox  97   03/04/22 13:00








                                 Intake & Output











 03/03/22 03/04/22 03/04/22





 18:59 06:59 18:59


 


Intake Total 250 120 


 


Output Total 370 40 


 


Balance -120 80 


 


Weight 124.2 kg  124.2 kg


 


Intake:   


 


  Intake, IV Titration 250  





  Amount   


 


    D5-0.45% NaCl with KCl 150  





    20Meq/l 1,000 ml @ 75 mls   





    /hr IV .[id number] BETTE Rx#:   





    [id number]   


 


    Fluconazole in NaCl,Iso- 100  





    Osm 200 mg In Saline 1   





    100ml.bag @ 100 mls/hr   





    IVPB DAILY@1600 [facility] Rx#:   





    [id number]   


 


  Oral  120 


 


Output:   


 


  Drainage 370 40 


 


    Abdomen 130 20 


 


    Left Abdomen 120 20 


 


    Right Abdomen 120  


 


Other:   


 


  Voiding Method Toilet Toilet Toilet


 


  # Voids 1 1 1


 


  # Bowel Movements 1  














- Exam


GENERAL DESCRIPTION: A middle-age female lying in bed in no distress





RESPIRATORY SYSTEM: Unlabored breathing , decreased breath sounds at bases





HEART: S1 S2 regular rate and rhythm ,





ABDOMEN: Soft , mild abdominal tenderness





EXTREMITIES: No edema feet








- Labs


CBC & Chem 7: 


                                 03/04/22 05:52





                                 03/04/22 05:52


Labs: 


                  Abnormal Lab Results - Last 24 Hours (Table)











  03/04/22 03/04/22 03/04/22 Range/Units





  05:52 05:52 12:03 


 


WBC  10.59 H    (4.50-10.00)  X 10*3/uL


 


RBC  3.88 L    (4.10-5.20)  X 10*6/uL


 


Hgb  10.7 L    (12.0-15.0)  g/dL


 


Hct  33.5 L    (37.2-46.3)  %


 


MCHC  31.9 L    (32.0-37.0)  g/dL


 


RDW  14.7 H    (11.5-14.5)  %


 


MPV  9.3 L    (9.5-12.2)  fL


 


Immature Gran #  0.37 H    (0.00-0.04)  X 10*3/uL


 


Chloride   110 H   ()  mmol/L


 


Anion Gap   8.40 L   (10.00-18.00)  mmol/L


 


BUN   2.9 L   (9.0-27.0)  mg/dL


 


BUN/Creatinine Ratio   4.14 L   (12.00-20.00)  Ratio


 


POC Glucose (mg/dL)    102 H  (75-99)  mg/dL


 


Calcium   7.8 L   (8.7-10.3)  mg/dL


 


Total Bilirubin   0.20 L   (0.30-1.20)  mg/dL


 


Total Protein   5.3 L   (6.2-8.2)  g/dL


 


Albumin   2.8 L   (3.8-4.9)  g/dL


 


Albumin/Globulin Ratio   1.12 L   (1.60-3.17)  g/dL














Assessment and Plan


(1) Peritonitis


Current Visit: Yes   Status: Acute   Code(s): K65.9 - PERITONITIS, UNSPECIFIED  

SNOMED Code(s): 12820577


   





(2) Perforated ulcer


Current Visit: Yes   Status: Acute   Code(s): K27.5 - CHRONIC OR UNSP PEPTIC 

ULCER, SITE UNSP, WITH PERFORATION   SNOMED Code(s): 96285552


   


Plan: 


1patient with admission to hospital abdominal pain mostly epigastric and right 

upper quadrant area with evidence of cholecystitis and  perforated peptic ulcer 

disease status post laparoscopic cholecystectomy and modified Lexa patch of 

the perforated ulcer will need to cover for the entire gram-negative and yeast 

to the likely pathogen causing peritonitis associated with perforated peptic 

ulcer disease


2abdominal fluid cultures has been finalized as no growth, patient to continue 

with Unasyn 3 g every 6 hours and Diflucan while inpatient and will transition 

to oral antibiotics on discharge





Time with Patient: Less than 30

## 2022-03-04 NOTE — XR
EXAMINATION TYPE: XR chest 1V

 

DATE OF EXAM: 3/4/2022

 

COMPARISON: Chest x-ray 2/28/2022

 

HISTORY: Shortness of breath

 

TECHNIQUE: Single frontal view of the chest is obtained.

 

FINDINGS:  Lung volumes remain low. Patient's surgical drain are again noted beneath the hemidiaphrag
ms. There is no evident pneumothorax or pleural effusion. Left-sided PICC line shows the distal tip a
t the cavoatrial junction level. Patchy subsegmental basilar atelectatic changes are present, right h
emidiaphragm remains elevated. Cardiac mediastinal silhouette is stable.

 

IMPRESSION:  Basilar atelectatic changes and additional findings above.

## 2022-03-05 NOTE — P.DS
Providers


Date of admission: 


02/26/22 15:27





Expected date of discharge: 03/05/22


Attending physician: 


Derek Esparza DO





Consults: 





                                        





02/26/22 04:31


Consult Physician Urgent 


   Consulting Provider: Jarek Lakhani


   Consult Reason/Comments: Medical management


   Do you want consulting provider notified?: Yes





02/26/22 15:25


Consult Physician Routine 


   Consulting Provider: Sammi Ayon


   Consult Reason/Comments: ICU management, sepsis, perforated viscus


   Do you want consulting provider notified?: Already Contacted





02/28/22 12:51


Consult Physician Routine 


   Consulting Provider: Ginny Dhillon


   Consult Reason/Comments: gastric perforation


   Do you want consulting provider notified?: Yes











Primary care physician: 


[facility] Course: 





Patient is a 36-year-old lady who presented to [facility] emergency 

department on date of admission with chief complaint of less than 24 hours of 

epigastric and right upper quadrant abdominal pain.  Initial ultrasound in the 

emergency department was consistent with acute cholecystitis, patient had a 

significant leukocytosis and transient periods of hypotension.  She was 

diagnosed with cholecystitis and admitted to the surgical service.  She was 

started on broad-spectrum antibiotics.  Arrangements were made for 

cholecystectomy in the morning.  She gave informed consent for the same after 

discussion of risks, and alternatives to treatment.  On laparoscopy there was an

abundance of bile seen over the dome of the liver without gallbladder 

perforation or necrosis.  Cholecystectomy was performed and further 

investigation pursued revealing a perforated antral ulcer just adjacent to the 

gallbladder.  As such on exploratory laparotomy was performed and modified 

Lexa patch repair was employed over a 1 cm perforated antral ulcer.  Margin of

the ulceration was biopsied.  She was admitted to the ICU initially given 

concerns for possible waxing and waning sepsis.  She ended up being 

hemodynamically stable and was subsequently transferred out of the unit to the 

medical surgical floor.  Antibiotics were continued with the antifungal 

coverage.  Infectious disease service was consulted.  A contrasted upper GI 

study showed no evidence of leak at the antral ulceration repair.  Patient was 

advanced from clear liquids to regular diet without issue.  She remained 

hemodynamically stable and afebrile postoperatively.  Patient was discharged 

home with a prescription to complete a full 7 day course of antibiotics, 

medications for pain and nausea.  She was instructed to avoid any lifting over 

10 pounds, abstain from nonsteroidal anti-inflammatory medications for the rest 

of her life and avoid nicotine alcohol and caffeine.  I advised her to follow up

with general surgery with one week, with primary care within 2 weeks and pursue 

a diagnostic EGD in about 8 weeks.  She was further prescribed twice daily acid 

suppression.  Patient has a wound VAC dressing and the soft tissue given gross 

contamination at time of laparotomy she'll be following with the wound care 

service on Monday Wednesday and Friday intervals for wound VAC dressing changes 

until the wound is adequately granulated and is starting to contract.


Assessment: 


Acute cholecystitis, antral perforation, long-term NSAID use


Procedures: 


Laparoscopic cholecystectomy with conversion to exploratory laparotomy and 

modified Lexa patch repair of antral ulcer.  PICC placement.





Patient Condition at Discharge: Good





Plan - Discharge Summary


Discharge Rx Participant: No


New Discharge Prescriptions: 


New


   Pantoprazole [Protonix] 40 mg PO BID #60 tab


   Acetaminophen Tab [Tylenol] 650 mg PO Q4HR PRN  tab


     PRN Reason: Fever And/ Or Pain





Continue


   Venlafaxine HCl [Effexor XR] 150 mg PO BID


   lamoTRIgine [LaMICtal] 200 mg PO BID


   ALPRAZolam [Xanax] 1 mg PO TID


   traZODone  mg PO HS


   Gabapentin 800 mg PO TID


   Dextroamphetamine/Amphetamine [Adderall] 20 mg PO TID





Discontinued


   Ibuprofen [Motrin] 800 mg PO TID


   Penicillin V Potassium [Pen Vee K] 500 mg PO BID


Discharge Medication List





Venlafaxine HCl [Effexor XR] 150 mg PO BID 05/02/14 [History]


lamoTRIgine [LaMICtal] 200 mg PO BID 05/02/14 [History]


ALPRAZolam [Xanax] 1 mg PO TID 05/19/16 [History]


Dextroamphetamine/Amphetamine [Adderall] 20 mg PO TID 02/26/22 [History]


Gabapentin 800 mg PO TID 02/26/22 [History]


traZODone  mg PO HS 02/26/22 [History]


Acetaminophen Tab [Tylenol] 650 mg PO Q4HR PRN  tab 03/04/22 [Rx]


Pantoprazole [Protonix] 40 mg PO BID #60 tab 03/04/22 [Rx]








Follow up Appointment(s)/Referral(s): 


Yuki Gil MD [Primary Care Provider] - 2 Weeks


[facility], [NON-STAFF] - 


Isaias,Derek, DO [Doctor of Osteopathic Medicine] - 1 Week


Activity/Diet/Wound Care/Special Instructions: 


Wound Vac change every Monday Wednesday Friday with wound care service





Will need follow up EGD in 8 weeks


Continue twice daily protonix for 8 weeks 


Follow up with Dr Esparza in the office in 1 week 


Follow up with Primary Care 





Discharge Disposition: HOME WITH HOME HEALTH SERVICES
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
